# Patient Record
Sex: MALE
[De-identification: names, ages, dates, MRNs, and addresses within clinical notes are randomized per-mention and may not be internally consistent; named-entity substitution may affect disease eponyms.]

---

## 2023-07-11 DIAGNOSIS — Z13.31 ENCOUNTER FOR SCREENING FOR DEPRESSION: ICD-10-CM

## 2023-07-11 DIAGNOSIS — Z00.00 ENCOUNTER FOR GENERAL ADULT MEDICAL EXAMINATION W/OUT ABNORMAL FINDINGS: ICD-10-CM

## 2023-07-11 DIAGNOSIS — Z29.9 ENCOUNTER FOR PROPHYLACTIC MEASURES, UNSPECIFIED: ICD-10-CM

## 2023-07-12 ENCOUNTER — APPOINTMENT (OUTPATIENT)
Dept: INTERNAL MEDICINE | Facility: CLINIC | Age: 38
End: 2023-07-12

## 2023-07-12 NOTE — HISTORY OF PRESENT ILLNESS
[de-identified] : 37 year M here for Complete Physical Exam.\par \par Pt is daily exercising?  Yes or No\par \par Vaccinations:\par covid \par flu \par tdap\par \par Screening:\par last colonoscopy:\par denies any unintentional weight loss, blood in stool, anemia or dysphagia of solids or liquids\par no family hx of colon cancer\par \par Past Medical History: \par \par Allergies:\par \par Medications:\par \par Surgical Hx:\par \par Family Hx:\par Mother\par Father\par Maternal Grandfather\par Maternal Grandmother\par Paternal Grandfather\par Paternal Grandmother\par Siblings:\par Children:\par pt denies any family hx of breast, colon, cervical, endometrial, uterine, ovarian,  lung, prostate  or testicular cancer\par \par Social\par ETOH - socially \par Smoker - denies \par Illicit Drug use - denies\par \par Occupation:\par \par Pt has no acute complaints\par \par \par \par

## 2023-07-12 NOTE — HEALTH RISK ASSESSMENT
[Change in mental status noted] : No change in mental status noted [Language] : denies difficulty with language [Behavior] : denies difficulty with behavior [Learning/Retaining New Information] : denies difficulty learning/retaining new information [Handling Complex Tasks] : denies difficulty handling complex tasks [Reasoning] : denies difficulty with reasoning [Spatial Ability and Orientation] : denies difficulty with spatial ability and orientation [Reports changes in hearing] : Reports no changes in hearing [Reports changes in vision] : Reports no changes in vision [Reports changes in dental health] : Reports no changes in dental health [Guns at Home] : no guns at home [Travel to Developing Areas] : does not  travel to developing areas [TB Exposure] : is not being exposed to tuberculosis [Caregiver Concerns] : does not have caregiver concerns